# Patient Record
Sex: FEMALE | Race: BLACK OR AFRICAN AMERICAN | Employment: UNEMPLOYED | URBAN - METROPOLITAN AREA
[De-identification: names, ages, dates, MRNs, and addresses within clinical notes are randomized per-mention and may not be internally consistent; named-entity substitution may affect disease eponyms.]

---
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
## 2018-03-01 ENCOUNTER — HOSPITAL ENCOUNTER (OUTPATIENT)
Dept: ULTRASOUND IMAGING | Age: 18
Discharge: HOME | End: 2018-03-03
Payer: COMMERCIAL

## 2018-03-01 DIAGNOSIS — Z34.03 SUPERVISION OF NORMAL FIRST TEEN PREGNANCY IN THIRD TRIMESTER: ICD-10-CM

## 2018-03-01 PROCEDURE — 76815 OB US LIMITED FETUS(S): CPT

## 2018-10-22 ENCOUNTER — HOSPITAL ENCOUNTER (INPATIENT)
Age: 18
LOS: 5 days | Discharge: HOME | DRG: 754 | End: 2018-10-27
Attending: PSYCHIATRY & NEUROLOGY | Admitting: PSYCHIATRY & NEUROLOGY
Payer: COMMERCIAL

## 2018-10-22 PROBLEM — F32.A DEPRESSION: Status: ACTIVE | Noted: 2018-10-22

## 2018-10-22 PROCEDURE — 1240000000 HC EMOTIONAL WELLNESS R&B

## 2018-10-22 RX ORDER — HYDROXYZINE PAMOATE 50 MG/1
50 CAPSULE ORAL EVERY 6 HOURS PRN
Status: DISCONTINUED | OUTPATIENT
Start: 2018-10-22 | End: 2018-10-27 | Stop reason: HOSPADM

## 2018-10-22 RX ORDER — MAGNESIUM HYDROXIDE/ALUMINUM HYDROXICE/SIMETHICONE 120; 1200; 1200 MG/30ML; MG/30ML; MG/30ML
30 SUSPENSION ORAL PRN
Status: DISCONTINUED | OUTPATIENT
Start: 2018-10-22 | End: 2018-10-27 | Stop reason: HOSPADM

## 2018-10-22 RX ORDER — HALOPERIDOL 5 MG/ML
5 INJECTION INTRAMUSCULAR EVERY 4 HOURS PRN
Status: DISCONTINUED | OUTPATIENT
Start: 2018-10-22 | End: 2018-10-27 | Stop reason: HOSPADM

## 2018-10-22 RX ORDER — BENZTROPINE MESYLATE 1 MG/ML
2 INJECTION INTRAMUSCULAR; INTRAVENOUS 2 TIMES DAILY PRN
Status: DISCONTINUED | OUTPATIENT
Start: 2018-10-22 | End: 2018-10-27 | Stop reason: HOSPADM

## 2018-10-22 RX ORDER — TRAZODONE HYDROCHLORIDE 50 MG/1
50 TABLET ORAL NIGHTLY PRN
Status: DISCONTINUED | OUTPATIENT
Start: 2018-10-22 | End: 2018-10-25

## 2018-10-22 RX ORDER — ACETAMINOPHEN 325 MG/1
650 TABLET ORAL EVERY 4 HOURS PRN
Status: DISCONTINUED | OUTPATIENT
Start: 2018-10-22 | End: 2018-10-27 | Stop reason: HOSPADM

## 2018-10-22 ASSESSMENT — SLEEP AND FATIGUE QUESTIONNAIRES
DO YOU USE A SLEEP AID: NO
SLEEP PATTERN: NORMAL
AVERAGE NUMBER OF SLEEP HOURS: 4.5
DO YOU HAVE DIFFICULTY SLEEPING: NO

## 2018-10-23 PROBLEM — T50.902A INTENTIONAL DRUG OVERDOSE (HCC): Status: ACTIVE | Noted: 2018-10-23

## 2018-10-23 PROBLEM — F32.2 CURRENT SEVERE EPISODE OF MAJOR DEPRESSIVE DISORDER WITHOUT PSYCHOTIC FEATURES WITHOUT PRIOR EPISODE (HCC): Status: ACTIVE | Noted: 2018-10-22

## 2018-10-23 PROCEDURE — 1240000000 HC EMOTIONAL WELLNESS R&B

## 2018-10-23 PROCEDURE — 99223 1ST HOSP IP/OBS HIGH 75: CPT | Performed by: PSYCHIATRY & NEUROLOGY

## 2018-10-23 ASSESSMENT — LIFESTYLE VARIABLES: HISTORY_ALCOHOL_USE: NO

## 2018-10-23 NOTE — PROGRESS NOTES
Pt. refused to attend the 1000 skills group, despite staff encouragement. Electronically signed by Charli Christie, [address] on 10/23/2018 at 1:46 PM

## 2018-10-23 NOTE — PROGRESS NOTES
Pt appears depressed but states she only feels slightly depressed and not anxious . She denies feeling suicidal and seems to minimize the seriousness of her overdose but admits it was a suicide attempt but denies any known precipitating factors . pts boyfriend moved out a month ago when they had a break up . pts mother has her infant now while she is in the hospital . Pt did agree to shower but feels uncomfortable in our shower room . She was offered support and reassurance and the shower room was set up for her and she did go in for the shower . Pt doesn't plan on leaving her room and states her appetite was poor at home and continues to be poor here . Pt did agree to try to eat something today . Her affect is very blunted .

## 2018-10-23 NOTE — PROGRESS NOTES
Pt does remain isolative  to her room . She brightens slightly when approached by staff . She cannot say why she impulsively took her overdose . She declined dinner and only ate a small amount of lunch but denies feeling depressed . She said she just feels tired and bored here. She seems uncomfortable being around her peers when out of her room . She doubts the dr will get much information from her mother as they dont live together and are not real close . She is alert and cooperative . her mother is watching her infant but was not real involved with her prior to this hospitalization  .

## 2018-10-23 NOTE — H&P
[facility]    HISTORY AND PHYSICAL EXAM    PATIENT NAME:  Radha Vigil    MRN:  [id number]  SERVICE DATE:  10/23/2018   SERVICE TIME:  10:30 AM    Primary Care Physician: DELMI Tanner CNP         SUBJECTIVE  CHIEF COMPLAINT:  Medical clear for inpatient psychiatry admission. Consult for medical H/P encounter. HPI:  This is a 21 y.o. female who is admitted to [address] [place] [address] after an intentional overdose immediately pta. Denies cp sob ha rash n v d f    PAST MEDICAL HISTORY:  History reviewed. No pertinent past medical history. PAST SURGICAL HISTORY:  History reviewed. No pertinent surgical history. FAMILY HISTORY:  History reviewed. No pertinent family history. SOCIAL HISTORY:    Social History     Social History    Marital status: Single     Spouse name: N/A    Number of children: N/A    Years of education: N/A     Occupational History    Not on file.      Social History Main Topics    Smoking status: Current Some Day Smoker    Smokeless tobacco: Not on file      Comment: smokes black and mild cigars    Alcohol use No    Drug use: No    Sexual activity: Yes     Partners: Male     Other Topics Concern    Not on file     Social History Narrative    No narrative on file     MEDICATIONS:    Current Facility-Administered Medications   Medication Dose Route Frequency Provider Last Rate Last Dose    acetaminophen (TYLENOL) tablet 650 mg  650 mg Oral Q4H PRN Ana María Paz MD        hydrOXYzine (VISTARIL) capsule 50 mg  50 mg Oral Q6H PRN Ana María Paz MD        haloperidol lactate (HALDOL) injection 5 mg  5 mg Intramuscular Q4H PRN Ana María Paz MD        traZODone (DESYREL) tablet 50 mg  50 mg Oral Nightly PRN Ana María Paz MD        benztropine mesylate (COGENTIN) injection 2 mg  2 mg Intramuscular BID PRN Ana María Paz MD        magnesium hydroxide (MILK OF MAGNESIA) 400 MG/5ML suspension 30 mL  30 mL Oral Daily PRN MD Kaylene Gtz

## 2018-10-23 NOTE — H&P
Department of Psychiatry  History and Physical - Adult     CHIEF COMPLAINT:  Depression, Suicide attempt by overdose    History obtained from:  patient    Patient was seen after discussing with the treatment team and reviewing the chart    HISTORY OF PRESENT ILLNESS:    The patient is a 21 y.o. female with no significant past history    DURING INTERVIEW :    Pt live alone and work for 2 jobs  Has been feeling depressed for 2 days   Stressors:work related stress, missed school work - dropped out of class due to work  Family issues - mom kicked her nephew and sister out last week and they were staying with her until last wednesday  Felt overwhelmed last Friday after her work, went home. Not under the influence of alcohol or drugs  Pt found a bunch of pills - including melatonin, flexeril and motrin - took the whole bunch, not sure about the quantity  Then went to bed, did not inform anybody. Was found unconscious  Sampson Ortiz did not see her at work and got suspicious and her friend came home to check her Saturday afternoon - found her unconscious  Pt does not remember the sequence of event  Pt woke up in the hospital.     Pt is feeling angry and irritated about lack of communication   Denies feeling depressed  Not opening up during the interview  Few word response. Denies feeling hopeless, worthless or suicidal  Remorseful about her suicidal attempt      The patient is not currently receiving care for the above psychiatric illness. Medications Prior to Admission:   No prescriptions prior to admission.     Compliance:no    Psychiatric Review of Systems       Depression: denies     [place] or Hypomania:  yes - gets angry, irritable, denies mood swings, denies any impulsive tendencies other than this overdose, no other risky behavior     Panic Attacks:  no     Phobias:  no     Obsessions and Compulsions:  no     PTSD : no     Hallucinations:  no     Delusions:  no    Substance Abuse History:  ETOH: no  Marijuana:

## 2018-10-24 PROBLEM — F31.9 BIPOLAR DISORDER, UNSPECIFIED (HCC): Status: ACTIVE | Noted: 2018-10-22

## 2018-10-24 LAB
ALBUMIN SERPL-MCNC: 3.7 G/DL (ref 3.9–4.9)
ALP BLD-CCNC: 70 U/L (ref 40–130)
ALT SERPL-CCNC: 8 U/L (ref 0–33)
ANION GAP SERPL CALCULATED.3IONS-SCNC: 11 MEQ/L (ref 7–13)
AST SERPL-CCNC: 14 U/L (ref 0–35)
BILIRUB SERPL-MCNC: <0.2 MG/DL (ref 0–1.2)
BUN BLDV-MCNC: 13 MG/DL (ref 6–20)
CALCIUM SERPL-MCNC: 9.2 MG/DL (ref 8.6–10.2)
CHLORIDE BLD-SCNC: 105 MEQ/L (ref 98–107)
CO2: 25 MEQ/L (ref 22–29)
CREAT SERPL-MCNC: 0.71 MG/DL (ref 0.5–0.9)
GFR AFRICAN AMERICAN: >60
GFR NON-AFRICAN AMERICAN: >60
GLOBULIN: 2.9 G/DL (ref 2.3–3.5)
GLUCOSE BLD-MCNC: 80 MG/DL (ref 74–109)
HCT VFR BLD CALC: 35.1 % (ref 37–47)
HEMOGLOBIN: 11.2 G/DL (ref 12–16)
MCH RBC QN AUTO: 24.1 PG (ref 27–31.3)
MCHC RBC AUTO-ENTMCNC: 31.8 % (ref 33–37)
MCV RBC AUTO: 75.9 FL (ref 82–100)
PDW BLD-RTO: 17.3 % (ref 11.5–14.5)
PLATELET # BLD: 249 K/UL (ref 130–400)
POTASSIUM SERPL-SCNC: 4.4 MEQ/L (ref 3.5–5.1)
RBC # BLD: 4.62 M/UL (ref 4.2–5.4)
SODIUM BLD-SCNC: 141 MEQ/L (ref 132–144)
TOTAL PROTEIN: 6.6 G/DL (ref 6.4–8.1)
WBC # BLD: 4.1 K/UL (ref 4.5–11)

## 2018-10-24 PROCEDURE — 36415 COLL VENOUS BLD VENIPUNCTURE: CPT

## 2018-10-24 PROCEDURE — 99232 SBSQ HOSP IP/OBS MODERATE 35: CPT | Performed by: PSYCHIATRY & NEUROLOGY

## 2018-10-24 PROCEDURE — 85027 COMPLETE CBC AUTOMATED: CPT

## 2018-10-24 PROCEDURE — 80053 COMPREHEN METABOLIC PANEL: CPT

## 2018-10-24 PROCEDURE — 1240000000 HC EMOTIONAL WELLNESS R&B

## 2018-10-24 PROCEDURE — 6370000000 HC RX 637 (ALT 250 FOR IP): Performed by: PSYCHIATRY & NEUROLOGY

## 2018-10-24 RX ORDER — DIVALPROEX SODIUM 500 MG/1
500 TABLET, EXTENDED RELEASE ORAL NIGHTLY
Status: DISCONTINUED | OUTPATIENT
Start: 2018-10-24 | End: 2018-10-25

## 2018-10-24 RX ADMIN — DIVALPROEX SODIUM 500 MG: 500 TABLET, EXTENDED RELEASE ORAL at 21:49

## 2018-10-24 NOTE — PROGRESS NOTES
Pt denies SI/HI or AVH. Pt out on the unit to self. Pt seen on the phone a couple times. Pt is flat and looks internally stimulated. Patient cooperative with vitals and then recheck of vitals. Patient evasive as she has a blank look when asked questions. After asking 2-3 times patient will give a yes or no or one word answer. Patient does not brighten at any point during the conversation. Pt slow in responses.

## 2018-10-24 NOTE — PROGRESS NOTES
BEHAVIORAL HEALTH FOLLOW-UP NOTE     10/24/2018     Patient was seen and examined in person, Chart reviewed   Patient's case discussed with staff/team    Chief Complaint: depression, Overdose    Interim History:     Pt is very angry and irritable during the interview  Pt report that she wants to go home   Short responses to questions  Pt is not opening up about her stress  Spoke with her mom briefly. Pt gave us permission to talk to her mom   Pt denies SI and think that the act was an impulsive one  Agreeable to take medication  Appetite: \  [] Normal/Unchanged  [] Increased  [x] Decreased      Sleep:       [] Normal/Unchanged  [x] Fair       [] Poor              Energy:    [] Normal/Unchanged  [] Increased  [x] Decreased        SI [] Present  [x] Absent    HI  []Present  [x] Absent     Aggression:  [] yes  [x] no    Patient is [] able  [] unable to CONTRACT FOR SAFETY     PAST MEDICAL/PSYCHIATRIC HISTORY:   History reviewed. No pertinent past medical history. FAMILY/SOCIAL HISTORY:  Family History   Problem Relation Age of Onset    Depression Sister      Social History     Social History    Marital status: Single     Spouse name: N/A    Number of children: N/A    Years of education: N/A     Occupational History    Not on file. Social History Main Topics    Smoking status: Current Some Day Smoker    Smokeless tobacco: Not on file      Comment: smokes black and mild cigars    Alcohol use No    Drug use: No    Sexual activity: Yes     Partners: Male     Other Topics Concern    Not on file     Social History Narrative    No narrative on file           ROS:  [x] All negative/unchanged except if checked.  Explain positive(checked items) below:  [] Constitutional  [] Eyes  [] Ear/Nose/Mouth/Throat  [] Respiratory  [] CV  [] GI  []   [] Musculoskeletal  [] Skin/Breast  [] Neurological  [] Endocrine  [] Heme/Lymph  [] Allergic/Immunologic    Explanation:     MEDICATIONS:    Current Facility-Administered Medications:     divalproex (DEPAKOTE ER) extended release tablet 500 mg, 500 mg, Oral, Nightly, Morenita Rodriguez MD    acetaminophen (TYLENOL) tablet 650 mg, 650 mg, Oral, Q4H PRN, Morenita Rodriguez MD    hydrOXYzine (VISTARIL) capsule 50 mg, 50 mg, Oral, Q6H PRN, Morenita Rodriguez MD    haloperidol lactate (HALDOL) injection 5 mg, 5 mg, Intramuscular, Q4H PRN, Morenita Rodriguez MD    traZODone (DESYREL) tablet 50 mg, 50 mg, Oral, Nightly PRN, Morenita Rodriguez MD    benztropine mesylate (COGENTIN) injection 2 mg, 2 mg, Intramuscular, BID PRN, Morenita Rodriguez MD    magnesium hydroxide (MILK OF MAGNESIA) 400 MG/5ML suspension 30 mL, 30 mL, Oral, Daily PRN, Morenita Rodriguez MD    aluminum & magnesium hydroxide-simethicone (MAALOX) 200-200-20 MG/5ML suspension 30 mL, 30 mL, Oral, PRN, Morenita Rodriguez MD      Examination:  /60   Pulse 123   Temp 98 °F (36.7 °C)   Resp 18   LMP 10/08/2018 (Approximate)   SpO2 100%   Gait - steady  Medication side effects(SE): no    Mental Status Examination:    Level of consciousness:  within normal limits   Appearance:  fair grooming and fair hygiene  Behavior/Motor:  psychomotor retardation  Attitude toward examiner:  cooperative  Speech:  slow   Mood: decreased range and depressed  Affect:  mood congruent  Thought processes:  poverty of thought   Thought content:  Suicidal Ideation:  denies suicidal ideation  Delusions:  no evidence of delusions  Perceptual Disturbance:  denies any perceptual disturbance  Cognition:  oriented to person, place, and time   Concentration poor  Insight poor   Judgement poor     ASSESSMENT:   Patient symptoms are:  [] Well controlled  [] Improving  [] Worsening  [x] No change      Diagnosis:   Principal Problem:    Bipolar disorder, unspecified ([facility]  Active Problems:    Intentional drug overdose ([facility]  Resolved Problems:    * No resolved hospital problems.  *      LABS:    Recent Labs      10/24/18   0631   WBC  4.1*   HGB  11.2* PLT  249     Recent Labs      10/24/18   0631   NA  141   K  4.4   CL  105   CO2  25   BUN  13   CREATININE  0.71   GLUCOSE  80     Recent Labs      10/24/18   0631   BILITOT  <0.2   ALKPHOS  70   AST  14   ALT  8     No results found for: [facility], BARBSCNU, LABBENZ, CANNAB, COCAINESCRN, LABMETH, OPIATESCREENURINE, PHENCYCLIDINESCREENURINE, PPXUR, ETOH  Lab Results   Component Value Date    TSH 0.935 11/19/2013     No results found for: LITHIUM  No results found for: VALPROATE, CBMZ    RISK ASSESSMENT: high risk for impulsivity    Treatment Plan:  Reviewed current Medications with the patient. Start depakote  Risks, benefits, side effects, drug-to-drug interactions and alternatives to treatment were discussed. Collateral information: pending  CD evaluation  Encourage patient to attend group and other milieu activities.   Discharge planning discussed with the patient and treatment team.    PSYCHOTHERAPY/COUNSELING:  [x] Therapeutic interview  [x] Supportive  [] CBT  [] Ongoing  [] Other    [x] Patient continues to need, on a daily basis, active treatment furnished directly by or requiring the supervision of inpatient psychiatric personnel      Anticipated Length of stay:            Electronically signed by Chasidy Mott MD on 10/24/2018 at 10:26 AM

## 2018-10-24 NOTE — PLAN OF CARE
Problem: Altered Mood, Depressive Behavior:  Goal: Able to verbalize acceptance of life and situations over which he or she has no control  Able to verbalize acceptance of life and situations over which he or she has no control   Outcome: Ongoing    Goal: Ability to disclose and discuss suicidal ideas will improve  Ability to disclose and discuss suicidal ideas will improve   Outcome: Ongoing

## 2018-10-25 PROCEDURE — 6370000000 HC RX 637 (ALT 250 FOR IP): Performed by: PSYCHIATRY & NEUROLOGY

## 2018-10-25 PROCEDURE — 99232 SBSQ HOSP IP/OBS MODERATE 35: CPT | Performed by: PSYCHIATRY & NEUROLOGY

## 2018-10-25 PROCEDURE — 1240000000 HC EMOTIONAL WELLNESS R&B

## 2018-10-25 RX ORDER — QUETIAPINE FUMARATE 50 MG/1
50 TABLET, FILM COATED ORAL NIGHTLY
Status: DISCONTINUED | OUTPATIENT
Start: 2018-10-25 | End: 2018-10-27 | Stop reason: HOSPADM

## 2018-10-25 RX ORDER — DIVALPROEX SODIUM 500 MG/1
500 TABLET, EXTENDED RELEASE ORAL
Status: DISCONTINUED | OUTPATIENT
Start: 2018-10-25 | End: 2018-10-27 | Stop reason: HOSPADM

## 2018-10-25 RX ADMIN — QUETIAPINE FUMARATE 50 MG: 50 TABLET ORAL at 20:47

## 2018-10-25 RX ADMIN — DIVALPROEX SODIUM 500 MG: 500 TABLET, EXTENDED RELEASE ORAL at 17:28

## 2018-10-25 NOTE — PROGRESS NOTES
Pt. refused to attend the 1000 skills group, despite staff encouragement. Electronically signed by Abhinav Blankenship, [address] on 10/25/2018 at 2:53 PM

## 2018-10-25 NOTE — PROGRESS NOTES
Labs      10/24/18   0631   BILITOT  <0.2   ALKPHOS  70   AST  14   ALT  8     No results found for: LABAMPH, BARBSCNU, LABBENZ, CANNAB, COCAINESCRN, LABMETH, OPIATESCREENURINE, PHENCYCLIDINESCREENURINE, PPXUR, ETOH  Lab Results   Component Value Date    TSH 0.935 11/19/2013     No results found for: LITHIUM  No results found for: VALPROATE, CBMZ      Treatment Plan:  Reviewed current Medications with the patient. Seroquel ordered  Risks, benefits, side effects, drug-to-drug interactions and alternatives to treatment were discussed. Collateral information: peding  CD evaluation  Encourage patient to attend group and other milieu activities.   Discharge planning discussed with the patient and treatment team.    PSYCHOTHERAPY/COUNSELING:  [x] Therapeutic interview  [x] Supportive  [] CBT  [] Ongoing  [] Other    [x] Patient continues to need, on a daily basis, active treatment furnished directly by or requiring the supervision of inpatient psychiatric personnel      Anticipated Length of stay:            Electronically signed by Peggy Robin MD on 10/25/2018 at 12:58 PM

## 2018-10-25 NOTE — PROGRESS NOTES
Patient denies all no SI/HI or AVH. Patient states that her taking the pills was impulsive. Patient further states that she has quit her schooling at [employer] for the timebeing in order to work more at this point. Patient states she loves her son and has the same schedule everyday. Pt mother helps her with her son. Pts demeanor has changed dramatically from yesterday. Patient is bright, polite, cooperative and states that she understands that she needs to lean on her mom (pt states that is hard for her as she is the middle child and her mom has a lot of other children to worry about). Pt states she will revisit going to [employer] in the future but for now is taking a break. Pt is smiling and states that her new medicine made her tired. (depakote is the medicine she started last evening). Patient states she has no problems with sleep or appetite and feels she knows what she has to do when she feels stressed. Patient is minimizing, smiling and states she is feeling \"much better. \"

## 2018-10-26 PROCEDURE — 99232 SBSQ HOSP IP/OBS MODERATE 35: CPT | Performed by: PSYCHIATRY & NEUROLOGY

## 2018-10-26 PROCEDURE — 6370000000 HC RX 637 (ALT 250 FOR IP): Performed by: PSYCHIATRY & NEUROLOGY

## 2018-10-26 PROCEDURE — 1240000000 HC EMOTIONAL WELLNESS R&B

## 2018-10-26 RX ADMIN — QUETIAPINE FUMARATE 50 MG: 50 TABLET ORAL at 21:49

## 2018-10-26 RX ADMIN — DIVALPROEX SODIUM 500 MG: 500 TABLET, EXTENDED RELEASE ORAL at 17:08

## 2018-10-26 NOTE — CARE COORDINATION
Group Therapy Note    Date: 10/25/2018  Start Time: 1130  End Time:  8868  Number of Participants: 12    Type of Group: Psychotherapy    Wellness Binder Information  Module Name:  x  Session Number:  x    Patient's Goal:  To be ok today    Notes:  Patient did not talk a lot today    Status After Intervention:  Unchanged    Participation Level: Minimal    Participation Quality: Appropriate      Speech:  normal      Thought Process/Content: Logical      Affective Functioning: Congruent      Mood: anxious      Level of consciousness:  Alert      Response to Learning: Progressing to goal      Endings: None Reported    Modes of Intervention: Support      Discipline Responsible: /Counselor   Electronically signed by COLEEN Veliz on 10/25/2018 at 2:41 PM   Electronically signed by Naida Veliz on 10/25/2018 at 2:41 PM      Signature:  Naida Veliz
Group Therapy Note    Date: 10/26/2018  Start Time: 1100  End Time:  1200  Number of Participants: 14    Type of Group: Psychotherapy    Wellness Binder Information  Module Name:  n/a  Session Number:  n/a    Patient's Goal: Patient will identify the importance of a support system. Notes: Patient was able to identify the importance of a support system.     Status After Intervention:  Unchanged    Participation Level: None    Participation Quality: Resistant      Speech:  mute      Thought Process/Content: Logical      Affective Functioning: Flat      Mood: dysphoric      Level of consciousness:  Inattentive      Response to Learning: Able to verbalize current knowledge/experience      Endings: None Reported    Modes of Intervention: Support      Discipline Responsible: /Counselor      Signature:  ABEL Moe
Network: none        [employer]

## 2018-10-27 VITALS
SYSTOLIC BLOOD PRESSURE: 85 MMHG | HEART RATE: 167 BPM | RESPIRATION RATE: 18 BRPM | TEMPERATURE: 97 F | DIASTOLIC BLOOD PRESSURE: 55 MMHG | OXYGEN SATURATION: 94 %

## 2018-10-27 LAB — VALPROIC ACID LEVEL: 42.6 UG/ML (ref 50–100)

## 2018-10-27 PROCEDURE — 80164 ASSAY DIPROPYLACETIC ACD TOT: CPT

## 2018-10-27 PROCEDURE — 36415 COLL VENOUS BLD VENIPUNCTURE: CPT

## 2018-10-27 RX ORDER — DIVALPROEX SODIUM 500 MG/1
500 TABLET, EXTENDED RELEASE ORAL
Qty: 15 TABLET | Refills: 2 | Status: SHIPPED | OUTPATIENT
Start: 2018-10-27

## 2018-10-27 RX ORDER — QUETIAPINE FUMARATE 50 MG/1
50 TABLET, FILM COATED ORAL NIGHTLY
Qty: 15 TABLET | Refills: 2 | Status: SHIPPED | OUTPATIENT
Start: 2018-10-27

## 2018-10-27 NOTE — PROGRESS NOTES
Pt. attended the 0900 community meeting. Electronically signed by Annabella Sarmiento on 10/27/2018 at 12:38 PM

## 2018-10-27 NOTE — DISCHARGE SUMMARY
Obsessions and Compulsions:  no     PTSD : no     Hallucinations:  no     Delusions:  no     Substance Abuse History:  ETOH: no  Marijuana: no  Opiates: no  Other Drugs: no     Past Psychiatric History:  Prior Diagnosis:  no  Psychiatrist: no  Therapist:no  Hospitalization: no  Hx of Suicidal Attempts: no  Hx of violence:  no  ECT: no  Previous discontinued Psychiatric Med Trials:       PAST MEDICAL/PSYCHIATRIC HISTORY:   History reviewed. No pertinent past medical history. FAMILY/SOCIAL HISTORY:  Family History   Problem Relation Age of Onset    Depression Sister      Social History     Social History    Marital status: Single     Spouse name: N/A    Number of children: N/A    Years of education: N/A     Occupational History    Not on file.      Social History Main Topics    Smoking status: Current Some Day Smoker    Smokeless tobacco: Not on file      Comment: smokes black and mild cigars    Alcohol use No    Drug use: No    Sexual activity: Yes     Partners: Male     Other Topics Concern    Not on file     Social History Narrative    No narrative on file       MEDICATIONS:    Current Facility-Administered Medications:     QUEtiapine (SEROQUEL) tablet 50 mg, 50 mg, Oral, Nightly, Jose Alberto Wiggins MD, 50 mg at 10/26/18 2149    divalproex (DEPAKOTE ER) extended release tablet 500 mg, 500 mg, Oral, Dinner, Jose Alberto Wiggins MD, 500 mg at 10/26/18 1708    acetaminophen (TYLENOL) tablet 650 mg, 650 mg, Oral, Q4H PRN, Jose Alberto Wiggins MD    hydrOXYzine (VISTARIL) capsule 50 mg, 50 mg, Oral, Q6H PRN, Jose Alberto Wiggins MD    haloperidol lactate (HALDOL) injection 5 mg, 5 mg, Intramuscular, Q4H PRN, Jose Alberto Wiggins MD    benztropine mesylate (COGENTIN) injection 2 mg, 2 mg, Intramuscular, BID PRN, Jose Alberto Wiggins MD    magnesium hydroxide (MILK OF MAGNESIA) 400 MG/5ML suspension 30 mL, 30 mL, Oral, Daily PRN, Jose Alberto Wiggins MD    aluminum & magnesium hydroxide-simethicone (MAALOX) [facility]  10/27/2018  10:09 AM

## 2018-10-27 NOTE — PROGRESS NOTES
AFFECT and mood stable  denied suicidal/homicidal ideation  belongings returned to patient  Reviewed and patient verbalized understanding of all instructions  Discharged at this time with friend for transport home

## 2024-03-14 NOTE — PROGRESS NOTES
Pt denies SI/HI or AVH. Patient does not want to elaborate and talk as she is upset that she is not being discharged. Patient does come out to eat dinner and has a visitor. Pt sits calmly talking with visitor.  Electronically signed by Bereket Osborn LPN on 20/92/6768 at 5:44 PM Detail Level: Zone Size Of Lesion In Cm (Optional): 0